# Patient Record
Sex: FEMALE | Race: WHITE
[De-identification: names, ages, dates, MRNs, and addresses within clinical notes are randomized per-mention and may not be internally consistent; named-entity substitution may affect disease eponyms.]

---

## 2020-09-12 ENCOUNTER — HOSPITAL ENCOUNTER (EMERGENCY)
Dept: HOSPITAL 9 - MADERS | Age: 65
Discharge: HOME | End: 2020-09-12
Payer: SELF-PAY

## 2020-09-12 DIAGNOSIS — I10: ICD-10-CM

## 2020-09-12 DIAGNOSIS — F17.210: ICD-10-CM

## 2020-09-12 DIAGNOSIS — M54.42: Primary | ICD-10-CM

## 2020-09-12 DIAGNOSIS — F41.9: ICD-10-CM

## 2020-09-12 DIAGNOSIS — Z79.899: ICD-10-CM

## 2020-09-12 PROCEDURE — 96372 THER/PROPH/DIAG INJ SC/IM: CPT

## 2020-09-12 PROCEDURE — 99283 EMERGENCY DEPT VISIT LOW MDM: CPT

## 2020-10-22 ENCOUNTER — HOSPITAL ENCOUNTER (INPATIENT)
Dept: HOSPITAL 92 - SJJU | Age: 65
LOS: 7 days | Discharge: HOME | DRG: 470 | End: 2020-10-29
Attending: INTERNAL MEDICINE | Admitting: ORTHOPAEDIC SURGERY
Payer: MEDICARE

## 2020-10-22 VITALS — BODY MASS INDEX: 19.5 KG/M2

## 2020-10-22 DIAGNOSIS — Z79.82: ICD-10-CM

## 2020-10-22 DIAGNOSIS — F17.210: ICD-10-CM

## 2020-10-22 DIAGNOSIS — Z88.1: ICD-10-CM

## 2020-10-22 DIAGNOSIS — M16.12: Primary | ICD-10-CM

## 2020-10-22 DIAGNOSIS — Z91.040: ICD-10-CM

## 2020-10-22 DIAGNOSIS — K21.9: ICD-10-CM

## 2020-10-22 DIAGNOSIS — I95.9: ICD-10-CM

## 2020-10-22 PROCEDURE — 85027 COMPLETE CBC AUTOMATED: CPT

## 2020-10-22 PROCEDURE — S0020 INJECTION, BUPIVICAINE HYDRO: HCPCS

## 2020-10-22 PROCEDURE — 82533 TOTAL CORTISOL: CPT

## 2020-10-22 PROCEDURE — 80048 BASIC METABOLIC PNL TOTAL CA: CPT

## 2020-10-22 PROCEDURE — 36415 COLL VENOUS BLD VENIPUNCTURE: CPT

## 2020-10-23 ENCOUNTER — HOSPITAL ENCOUNTER (OUTPATIENT)
Dept: HOSPITAL 92 - LABBT | Age: 65
Discharge: HOME | End: 2020-10-23
Attending: ORTHOPAEDIC SURGERY
Payer: MEDICARE

## 2020-10-23 DIAGNOSIS — Z20.828: ICD-10-CM

## 2020-10-23 DIAGNOSIS — Z01.812: Primary | ICD-10-CM

## 2020-10-23 DIAGNOSIS — M16.12: ICD-10-CM

## 2020-10-23 LAB
ANION GAP SERPL CALC-SCNC: 17 MMOL/L (ref 10–20)
BASOPHILS # BLD AUTO: 0.1 THOU/UL (ref 0–0.2)
BASOPHILS NFR BLD AUTO: 0.9 % (ref 0–1)
BUN SERPL-MCNC: 29 MG/DL (ref 9.8–20.1)
CALCIUM SERPL-MCNC: 10 MG/DL (ref 7.8–10.44)
CHLORIDE SERPL-SCNC: 107 MMOL/L (ref 98–107)
CO2 SERPL-SCNC: 23 MMOL/L (ref 23–31)
CREAT CL PREDICTED SERPL C-G-VRATE: 0 ML/MIN (ref 70–130)
EOSINOPHIL # BLD AUTO: 0.1 THOU/UL (ref 0–0.7)
EOSINOPHIL NFR BLD AUTO: 0.8 % (ref 0–10)
GLUCOSE SERPL-MCNC: 89 MG/DL (ref 80–115)
HGB BLD-MCNC: 12.6 G/DL (ref 12–16)
INR PPP: 0.9
LYMPHOCYTES # BLD: 1.6 THOU/UL (ref 1.2–3.4)
LYMPHOCYTES NFR BLD AUTO: 20.1 % (ref 21–51)
MCH RBC QN AUTO: 34.4 PG (ref 27–31)
MCV RBC AUTO: 98.1 FL (ref 78–98)
MONOCYTES # BLD AUTO: 0.5 THOU/UL (ref 0.11–0.59)
MONOCYTES NFR BLD AUTO: 6 % (ref 0–10)
NEUTROPHILS # BLD AUTO: 5.7 THOU/UL (ref 1.4–6.5)
NEUTROPHILS NFR BLD AUTO: 72.1 % (ref 42–75)
PLATELET # BLD AUTO: 236 THOU/UL (ref 130–400)
POTASSIUM SERPL-SCNC: 4.8 MMOL/L (ref 3.5–5.1)
PROTHROMBIN TIME: 12.2 SEC (ref 12–14.7)
RBC # BLD AUTO: 3.67 MILL/UL (ref 4.2–5.4)
SODIUM SERPL-SCNC: 142 MMOL/L (ref 136–145)
WBC # BLD AUTO: 7.9 THOU/UL (ref 4.8–10.8)

## 2020-10-23 PROCEDURE — 85610 PROTHROMBIN TIME: CPT

## 2020-10-23 PROCEDURE — U0003 INFECTIOUS AGENT DETECTION BY NUCLEIC ACID (DNA OR RNA); SEVERE ACUTE RESPIRATORY SYNDROME CORONAVIRUS 2 (SARS-COV-2) (CORONAVIRUS DISEASE [COVID-19]), AMPLIFIED PROBE TECHNIQUE, MAKING USE OF HIGH THROUGHPUT TECHNOLOGIES AS DESCRIBED BY CMS-2020-01-R: HCPCS

## 2020-10-23 PROCEDURE — 87635 SARS-COV-2 COVID-19 AMP PRB: CPT

## 2020-10-23 PROCEDURE — 87081 CULTURE SCREEN ONLY: CPT

## 2020-10-23 PROCEDURE — 85025 COMPLETE CBC W/AUTO DIFF WBC: CPT

## 2020-10-23 PROCEDURE — 80048 BASIC METABOLIC PNL TOTAL CA: CPT

## 2020-10-27 PROCEDURE — 0SRB049 REPLACEMENT OF LEFT HIP JOINT WITH CERAMIC ON POLYETHYLENE SYNTHETIC SUBSTITUTE, CEMENTED, OPEN APPROACH: ICD-10-PCS | Performed by: ORTHOPAEDIC SURGERY

## 2020-10-27 RX ADMIN — CEFAZOLIN SODIUM SCH MLS: 2 SOLUTION INTRAVENOUS at 20:36

## 2020-10-27 RX ADMIN — ASPIRIN SCH MG: 81 TABLET ORAL at 21:34

## 2020-10-27 NOTE — PDOC.HHP
Hospitalist HPI





- History of Present Illness


Left hip pain


History of Present Illness: 





The patient is a 65-year-old female with a past medical history significant for 

scoliosis and degenerative joint disease and GERD that presents to the hospital 

for a scheduled left total hip replacement by Dr. Soria.  The patient has had 

increased pain and swelling to her left lower extremity.  For these reasons she 

was scheduled for a left total hip replacement.  Apparently, after surgery, the 

patient came to the floor with a low blood pressure and was lethargic.  The 

patient was not getting any IV fluids and did have a fentanyl block infusing.  

The nurse called Dr. Gonzalez, who was the on-call orthopedic.  The block was 

stopped.  IV fluids were started.  We were consulted for management of the blood

pressure.  The patient reports that she has a history of low blood pressure.  

She denies any fever or chills.  No recent illness.  She denies feeling short of

breath, cough or wheezing.  She is not on any chronic steroids.  She denies any 

chest pain, heart palpitations, lightheadedness.  She denies any abdominal pain,

nausea, vomiting, diarrhea.  She denies any hemoptysis, hematochezia/melena.  

She denies any endocrine disorders.


ED Course: 





Direct admit.





Hospitalist ROS





- Review of Systems


All other systems reviewed; all pertinent +/- noted in HPI/Subj





- Medication


Medications: 


Active Medications











Generic Name Dose Route Start Last Admin





  Trade Name Freq  PRN Reason Stop Dose Admin


 


Aspirin  81 mg  10/27/20 21:00  10/27/20 21:34





  Aspirin 81 Mg Enteric Coated Tablet  PO   81 mg





  BID THAO   Administration


 


Sodium Chloride  1,000 mls @ 125 mls/hr  10/27/20 20:00  10/27/20 20:30





  Normal Saline 0.9%  IV   1,000 mls





  .Q8H THAO   Administration


 


Ketorolac Tromethamine  30 mg  10/27/20 18:00  10/27/20 19:56





  Ketorolac Tromethamine 30 Mg/Ml Vial  IVP  10/29/20 12:01  Not Given





  [facility]  








Home medications:


1.  Excedrin Migraine as needed


2.  Pepcid 20 mg p.o. twice daily as needed


3.  Woman's 50+ multivitamin daily


4.  Tizanidine as needed.





Allergies: Latex, ciprofloxacin





Hospitalist History





- Past Medical History


Source: patient, RN notes reviewed


Cardiac: reports: no pertinent history


Pulmonary: reports: no pertinent history


CNS: reports: Migraine


Musculoskeletal: reports: Other (Degenerative joint disease, scoliosis)





- Past Surgical History


Past Surgical History: reports:  (X5), Hernia Repair





- Family History


Family History: reports: no pertinent history


Other Family History: 





Noncontributory to this case





- Social History


Smoking Status: Current every day smoker (Half pack per day)


Tobacco Type: cigarettes


Alcohol: reports: None


Drugs: reports: none


Living Situation: Alone


Occupation: Works as a CNA


Activity level: uses cane/walker





- Exam


General Appearance: NAD, awake alert.  negative: ill appearing


Eye: PERRL, anicteric sclera


ENT: normocephalic atraumatic, dry oral mucosa


Neck: supple, symmetric, no JVD


Heart: RRR, no murmur, no gallops, no rubs, normal peripheral pulses


Respiratory: CTAB, no wheezes, no rales, no ronchi, normal chest expansion, no 

tachypnea


Gastrointestinal: soft, non-tender, normal bowel sounds, no bruit, no guarding, 

no rigidity


Extremities: no cyanosis, no edema


Skin: no rashes


Neurological: cranial nerve grossly intact, no focal deficits.  negative: facial

droop, speech deficit


Musculoskeletal - other findings: Status post left hip replacement


Psychiatric: normal affect, A&O x 3





Hospitalist Results





- Labs


Lab results: 


Reviewed 10/20/2020:


Sodium 142, potassium 4.8, chloride 107, CO2 23, BUN 29, creatinine 0.86, 

glucose 89, calcium 10


WBC 7.9, hemoglobin 12.6, hematocrit 36, platelets 236


Covid negative





Hospitalist H&P A/P





- Problem


(1) Hypotension


Status: Acute   





(2) Degenerative joint disease


Code(s): M19.90 - UNSPECIFIED OSTEOARTHRITIS, UNSPECIFIED SITE   Status: Chronic

  





(3) GERD (gastroesophageal reflux disease)


Code(s): K21.9 - GASTRO-ESOPHAGEAL REFLUX DISEASE WITHOUT ESOPHAGITIS   Status: 

Chronic   





(4) Status post total hip replacement, left


Code(s): Z96.642 - PRESENCE OF LEFT ARTIFICIAL HIP JOINT   Status: Acute   





- Plan


Plan: 





65/F with PMH DJD, GERD presents for scheduled left total hip replacement.  We 

were consulted for medical management for low blood pressure reading.


Patient admitted to surgical floor, inpatient status.  Expected length of stay 

greater than 2 midnights.





#Hypotension


Nursing called to report blood pressure reading 80s/50s.


Called Dr. Gonzalez, on-call orthopedic.


Ordered IV fluids and stop fentanyl block.


Upon assessment, patient alert and oriented x4.


BP 98/65.  Afebrile.  Glucose WNL.


Continue IV fluid hydration, analgesia as needed.





#Degenerative joint disease


Chronic.





#GERD


Takes Pepcid twice daily as needed.


Agreed to continue home dose of Pepcid.





#Status post total hip replacement, left


Performed by Dr. Soria.


Clinical course per orthopedics.





Aspirin for DVT prophylaxis.


Pepcid for GI prophylaxis.


Fentanyl nerve block per anesthesia.


Full code.


Family contact is her sister Jaynce Parmer at [phone].


Discussed the case with Dr. Aguirre.

## 2020-10-27 NOTE — RAD
Exam:2 views left hip



HISTORY: Status post arthroplasty



COMPARISON: 9/16/2020



FINDINGS: Postsurgical changes compatible with left hip arthroplasty. There are expected postoperativ
e changes in the soft tissues. Near anatomic alignment.



IMPRESSION: Findings compatible with left hip arthroplasty.



Reported By: Nazanin Mays 

Electronically Signed:  10/27/2020 4:07 PM

## 2020-10-28 LAB
ANION GAP SERPL CALC-SCNC: 12 MMOL/L (ref 10–20)
BUN SERPL-MCNC: 16 MG/DL (ref 9.8–20.1)
CALCIUM SERPL-MCNC: 8.4 MG/DL (ref 7.8–10.44)
CHLORIDE SERPL-SCNC: 109 MMOL/L (ref 98–107)
CO2 SERPL-SCNC: 23 MMOL/L (ref 23–31)
CREAT CL PREDICTED SERPL C-G-VRATE: 62 ML/MIN (ref 70–130)
GLUCOSE SERPL-MCNC: 113 MG/DL (ref 80–115)
HGB BLD-MCNC: 10.4 G/DL (ref 12–16)
MCH RBC QN AUTO: 33.6 PG (ref 27–31)
MCV RBC AUTO: 100 FL (ref 78–98)
PLATELET # BLD AUTO: 206 THOU/UL (ref 130–400)
POTASSIUM SERPL-SCNC: 4.5 MMOL/L (ref 3.5–5.1)
RBC # BLD AUTO: 3.11 MILL/UL (ref 4.2–5.4)
SODIUM SERPL-SCNC: 139 MMOL/L (ref 136–145)
WBC # BLD AUTO: 9.6 THOU/UL (ref 4.8–10.8)

## 2020-10-28 RX ADMIN — DOCUSATE SODIUM 50 MG AND SENNOSIDES 8.6 MG SCH TAB: 8.6; 5 TABLET, FILM COATED ORAL at 19:50

## 2020-10-28 RX ADMIN — MULTIPLE VITAMINS W/ MINERALS TAB SCH TAB: TAB at 08:46

## 2020-10-28 RX ADMIN — CEFAZOLIN SODIUM SCH MLS: 2 SOLUTION INTRAVENOUS at 05:15

## 2020-10-28 RX ADMIN — ASPIRIN SCH MG: 81 TABLET ORAL at 19:50

## 2020-10-28 RX ADMIN — ASPIRIN SCH MG: 81 TABLET ORAL at 08:46

## 2020-10-28 RX ADMIN — DOCUSATE SODIUM 50 MG AND SENNOSIDES 8.6 MG SCH TAB: 8.6; 5 TABLET, FILM COATED ORAL at 08:46

## 2020-10-28 NOTE — OP
DATE OF PROCEDURE:  10/27/2020



This is Stewart Henderson PA-C dictating a report for Oneil Soria MD.



PREOPERATIVE DIAGNOSIS:  End-stage bicompartmental osteoarthritis, left hip.



POSTOPERATIVE DIAGNOSIS:  End-stage bicompartmental osteoarthritis, left hip.



PROCEDURE PERFORMED:  Press-fit left total hip arthroplasty.



ASSISTANT:  Stewart Henderson PA-C



ANESTHESIA:  General via endotracheal tube augmented with indwelling epidural.



COMPONENTS USED:  [employer] Trident II titanium press-fit 48 mm press-fit

acetabular shell with an Accolade II press-fit size 4 hip stem, 0 degree

polyethylene fixed bearing insert, and a Biolox 36 mm outer diameter, -5 neck length

ceramic femoral head. 



ESTIMATED BLOOD LOSS:  100.



FINDINGS:  End-stage severe degenerative bicompartmental disease, bone-on-bone

arthrosis, periarticular osteophyte formation, large serous effusion, hypertrophic

synovium, and changes consistent with chronic degenerative osteoarthritis. 



DRAINS:  None.



SPECIMENS:  None.



COMPLICATIONS:  None.



COUNTS:  Correct.



INPUT:  1 L crystalloid.



OUTPUT:  500 mL clear yellow urine.



INDICATION FOR SURGERY:  Cinthia is a  65-year-old white female, who has had

progressive left hip groin and thigh pain, and problem with standing or walking for

the last 5 to 7 years.  She has failed conservative management, elected to proceed

with total hip arthroplasty as definitive treatment of her pain. 



PROCEDURE IN DETAIL:  After informed consent was obtained in the preoperative

holding area, the patient was taken to the operative suite where general anesthesia

was induced.  The patient was then positioned in the lateral decubitus position.

The hip was then prepped and draped in usual sterile fashion.  The patient received

preoperative antibiotics.  Prior to incision, time-out was called and all members of

the surgical team agreed upon site, surgeon, and patient.  After this, a

longitudinal incision was made directly over the trochanter, noted by palpation

extending 2 fingerbreadths above and below the trochanter.  The deeper subcutaneous

layer was undermined with Bovie electrocautery.  The iliotibial band was encountered

and incised sharply and the plane below this was developed bluntly.  A Charnley

retractor was placed to hold this opened.  The lateral aspect of the trochanter and

the abductor muscles were encountered and then reflected anteriorly off the

trochanter using Bovie electrocautery.  Once this was completed, the anterior

capsule was then encountered and identified and copious capsulotomy was carried out,

exposing the femoral neck and head.  Dislocation maneuver was then performed and an

in situ provisional neck cut was then made using the oscillating saw.  Attention was

then turned to acetabular preparation.  Sequential reaming was carried out up to the

appropriate diameter and a trial was then malleted into place with good firm

resistance and no pullout.  The permanent acetabular shell was then malleted

squarely into place, as was the appropriate liner.  Once completed, the wound was

copiously irrigated and attention was then turned to femoral preparation.  Flexion

and external rotation were performed of the exposed thigh and femoral elevators were

then placed at the proximal aspect of the wound.  Canal finder was used to establish

the length of the canal and sequential reaming was carried out, followed by

broaching.  Once the appropriate stability was established with the trial broaches

with flexion, extension and rotational stability, we did trial with neutral and 2 mm

offset incremental necks.  Once the appropriate size was decided upon, with good

stability noted with flexion, extension, internal and external rotation and shuck

being negative, we removed the femoral trial broach and malleted into place the

permanent prosthesis with good firm fit, which was also stable to rotation.  Again,

the hip felt very stable to flexion, extension, internal and external rotation.  Leg

lengths appeared near anatomic clinically and we were quite happy with prosthesis

placement.  Copious irrigation was then carried out through the entirety of the

wound.  Primary closure of the abductors was accomplished with interrupted #2 Vicryl

figure-of-eight stitches and the IT band was then closed with interrupted #2 Vicryl,

oversewn with a #2 running barbed Quill stitch.  Subcutaneous fascia was closed with

running barbed Quill stitch and a subcuticular Monocryl barbed Quill stitch was used

for skin closure and augmented with skin cement.  A sterile dressing was applied.

The procedure was terminated without any complication.  All counts were correct.

The patient was awakened in the operative suite and taken to the recovery room in

stable condition. 



The assistant surgeon helped throughout the procedure by positioning the patient,

stabilizing the limb, holding retractors, aligning the prosthesis, and closure of

procedure site. 



The assistant/co-surgeon was present through the entire procedure and was

responsible for providing exposure, tissue retraction and any necessary limb or

tissue manipulation required to obtain necessary reduction or hardware placement.

The assistant/co-surgeon also provided bleeding control, tissue closure, and

suturing in conjunction with the primary surgeon. 







Job ID:  [id number]

## 2020-10-28 NOTE — PDOC.HOSPP
- Subjective


Encounter Date: 10/28/20


Subjective: 


Patient was seen and examined.


She denies any new complaints today.





- Objective


Vital Signs & Weight: 


                             Vital Signs (12 hours)











  Temp Pulse Resp BP BP BP BP


 


 10/28/20 11:04  99.0 F  66  16    103/50 L 


 


 10/28/20 09:42     89/52 L  101/60  


 


 10/28/20 08:00       


 


 10/28/20 07:27  99.2 F  66  16    94/53 L 


 


 10/28/20 05:18  98.1 F  72  14     104/65














  Pulse Ox


 


 10/28/20 11:04  94 L


 


 10/28/20 09:42 


 


 10/28/20 08:00  96


 


 10/28/20 07:27  96


 


 10/28/20 05:18  98








                                     Weight











Weight                         110 lb














I&O: 


                                        











 10/27/20 10/28/20 10/29/20





 06:59 06:59 06:59


 


Intake Total  2200 


 


Output Total  1225 


 


Balance  975 











Result Diagrams: 


                                 10/28/20 05:25





                                 10/28/20 05:25





Hospitalist ROS





- Medication


Medications: 


Active Medications











Generic Name Dose Route Start Last Admin





  Trade Name Freq  PRN Reason Stop Dose Admin


 


Aspirin  81 mg  10/27/20 21:00  10/28/20 08:46





  Aspirin 81 Mg Enteric Coated Tablet  PO   81 mg





  BID THAO   Administration


 


Ferrous Gluconate  324 mg  10/28/20 08:00  10/28/20 08:45





  Ferrous Gluconate 324 Mg Tab  PO   324 mg





  BID-WM THAO   Administration


 


Sodium Chloride  1,000 mls @ 125 mls/hr  10/27/20 20:00  10/28/20 13:40





  Normal Saline 0.9%  IV   1,000 mls





  .Q8H THAO   Administration


 


Iron/Minerals/Multivitamins  1 tab  10/28/20 09:00  10/28/20 08:46





  Multivitamin W/ Minerals 1 Tab  PO   1 tab





  DAILY THAO   Administration


 


Ketorolac Tromethamine  30 mg  10/27/20 18:00  10/28/20 13:39





  Ketorolac Tromethamine 30 Mg/Ml Vial  IVP  10/29/20 12:01  30 mg





  Q6HR THAO   Administration


 


Senna/Docusate Sodium  2 tab  10/28/20 09:00  10/28/20 08:46





  Senokot S 8.6-50 Mg Tab  PO   2 tab





  BID THAO   Administration














- Exam


General Appearance: awake alert


ENT: normocephalic atraumatic


Neck: supple, no JVD


Heart: RRR


Respiratory: no tachypnea


Gastrointestinal: soft


Neurological: cranial nerve grossly intact, no focal deficits





Hosp A/P


(1) Hypotension


Status: Acute   





(2) Status post total hip replacement, left


Code(s): Z96.642 - PRESENCE OF LEFT ARTIFICIAL HIP JOINT   Status: Acute   





(3) Degenerative joint disease


Code(s): M19.90 - UNSPECIFIED OSTEOARTHRITIS, UNSPECIFIED SITE   Status: Chronic

  





(4) GERD (gastroesophageal reflux disease)


Code(s): K21.9 - GASTRO-ESOPHAGEAL REFLUX DISEASE WITHOUT ESOPHAGITIS   Status: 

Chronic   





- Plan





The patient hypotension has improved since yesterday.


Continue IV hydration for the next 24 hours.


Continue postoperative management per surgical team.


Lovenox for DVT prophylaxis.


PT and OT evaluation.

## 2020-10-29 VITALS — TEMPERATURE: 97.9 F | SYSTOLIC BLOOD PRESSURE: 91 MMHG | DIASTOLIC BLOOD PRESSURE: 51 MMHG

## 2020-10-29 LAB
HGB BLD-MCNC: 8.5 G/DL (ref 12–16)
MCH RBC QN AUTO: 33 PG (ref 27–31)
MCV RBC AUTO: 102 FL (ref 78–98)
PLATELET # BLD AUTO: 147 THOU/UL (ref 130–400)
RBC # BLD AUTO: 2.58 MILL/UL (ref 4.2–5.4)
WBC # BLD AUTO: 7 THOU/UL (ref 4.8–10.8)

## 2020-10-29 RX ADMIN — MULTIPLE VITAMINS W/ MINERALS TAB SCH TAB: TAB at 09:34

## 2020-10-29 RX ADMIN — DOCUSATE SODIUM 50 MG AND SENNOSIDES 8.6 MG SCH TAB: 8.6; 5 TABLET, FILM COATED ORAL at 09:34

## 2020-10-29 RX ADMIN — ASPIRIN SCH MG: 81 TABLET ORAL at 09:33

## 2020-10-29 NOTE — PDOC.EVN
Event Note





- Event Note


Event Note: 





Nursing called, patient hypotensive, asymptomatic. Afebrile. Fentanyl block 

held. Gave 1L NS. Review of vital signs shows BP stable throughout dayshift, 

then drops at night. Likely normal physiologic response at night. No signs of 

FVO. Will give 1 more 500ml bolus. Discussed with Dr. Aguirre.

## 2020-10-29 NOTE — PDOC.HOSPP
- Subjective


Encounter Date: 10/29/20


Subjective: 


Patient has no new complaints today.


She denies any dizziness, palpitations, chest pain, or syncope.





- Objective


Vital Signs & Weight: 


                             Vital Signs (12 hours)











  Temp Pulse Resp BP BP Pulse Ox


 


 10/29/20 09:38       95


 


 10/29/20 07:49  97.9 F  52 L  12  91/51 L   92 L


 


 10/29/20 03:25  98.3 F  75  16   85/51 L  95


 


 10/29/20 02:35   57 L    92/58 L 








                                     Weight











Admit Weight                   110 lb


 


Weight                         110 lb














I&O: 


                                        











 10/28/20 10/29/20 10/30/20





 06:59 06:59 06:59


 


Intake Total 2200 4505 


 


Output Total 1225 2325 


 


Balance 975 2180 











Result Diagrams: 


                                 10/29/20 05:05





                                 10/28/20 05:25





Hospitalist ROS





- Medication


Medications: 


Active Medications











Generic Name Dose Route Start Last Admin





  Trade Name Freq  PRN Reason Stop Dose Admin


 


Aspirin  81 mg  10/27/20 21:00  10/29/20 09:33





  Aspirin 81 Mg Enteric Coated Tablet  PO   81 mg





  BID THAO   Administration


 


Ferrous Gluconate  324 mg  10/28/20 08:00  10/29/20 09:32





  Ferrous Gluconate 324 Mg Tab  PO   324 mg





  BID-WM THAO   Administration


 


Sodium Chloride  1,000 mls @ 125 mls/hr  10/27/20 20:00  10/28/20 23:35





  Normal Saline 0.9%  IV   1,000 mls





  .Q8H THAO   Administration


 


Iron/Minerals/Multivitamins  1 tab  10/28/20 09:00  10/29/20 09:34





  Multivitamin W/ Minerals 1 Tab  PO   1 tab





  DAILY THAO   Administration


 


Ketorolac Tromethamine  30 mg  10/27/20 18:00  10/29/20 05:41





  Ketorolac Tromethamine 30 Mg/Ml Vial  IVP  10/29/20 12:01  30 mg





  Q6HR THAO   Administration


 


Ondansetron HCl  4 mg  10/27/20 14:00  10/28/20 22:31





  Ondansetron Pf 4 Mg/2 Ml Vial  IVP   4 mg





  Q6H PRN   Administration





  Nausea/Vomiting  


 


Senna/Docusate Sodium  2 tab  10/28/20 09:00  10/29/20 09:34





  Senokot S 8.6-50 Mg Tab  PO   2 tab





  BID THAO   Administration


 


Tizanidine HCl  4 mg  10/27/20 12:57  10/28/20 14:36





  Tizanidine Hcl 4 Mg Tab  PO   4 mg





  Q6H PRN   Administration





  Muscle Spasm  














- Exam


General Appearance: awake alert


ENT: normocephalic atraumatic


Neck: supple, no JVD


Respiratory: normal chest expansion, no tachypnea


Extremities: no cyanosis, no clubbing


Neurological: cranial nerve grossly intact, no focal deficits





Hosp A/P


(1) Hypotension


Status: Acute   





(2) Status post total hip replacement, left


Code(s): Z96.642 - PRESENCE OF LEFT ARTIFICIAL HIP JOINT   Status: Acute   





(3) Degenerative joint disease


Code(s): M19.90 - UNSPECIFIED OSTEOARTHRITIS, UNSPECIFIED SITE   Status: Chronic

  





(4) GERD (gastroesophageal reflux disease)


Code(s): K21.9 - GASTRO-ESOPHAGEAL REFLUX DISEASE WITHOUT ESOPHAGITIS   Status: 

Chronic   





- Plan





The patient systolic blood pressure is in the mid 90s.


The patient is completely asymptomatic.


Continue postoperative management per surgical team.


She is medically stable to be discharged.


Lovenox for DVT prophylaxis.


PT and OT evaluation.

## 2020-11-13 ENCOUNTER — HOSPITAL ENCOUNTER (EMERGENCY)
Dept: HOSPITAL 9 - MADERS | Age: 65
Discharge: HOME | End: 2020-11-13
Payer: MEDICARE

## 2020-11-13 DIAGNOSIS — F17.210: ICD-10-CM

## 2020-11-13 DIAGNOSIS — I10: ICD-10-CM

## 2020-11-13 DIAGNOSIS — N10: Primary | ICD-10-CM

## 2020-11-13 DIAGNOSIS — D50.0: ICD-10-CM

## 2020-11-13 DIAGNOSIS — F41.9: ICD-10-CM

## 2020-11-13 DIAGNOSIS — E87.6: ICD-10-CM

## 2020-11-13 LAB
ALBUMIN SERPL BCG-MCNC: 3.8 G/DL (ref 3.4–4.8)
ALP SERPL-CCNC: 294 U/L (ref 40–110)
ALT SERPL W P-5'-P-CCNC: 38 U/L (ref 8–55)
AMYLASE SERPL-CCNC: 69 U/L (ref 25–125)
ANION GAP SERPL CALC-SCNC: 19 MMOL/L (ref 10–20)
AST SERPL-CCNC: 41 U/L (ref 5–34)
BACTERIA UR QL AUTO: (no result) HPF
BASOPHILS # BLD AUTO: 0.1 THOU/UL (ref 0–0.2)
BASOPHILS NFR BLD AUTO: 0.7 % (ref 0–1)
BILIRUB SERPL-MCNC: 0.2 MG/DL (ref 0.2–1.2)
BUN SERPL-MCNC: 19 MG/DL (ref 9.8–20.1)
CALCIUM SERPL-MCNC: 9.6 MG/DL (ref 7.8–10.44)
CHLORIDE SERPL-SCNC: 95 MMOL/L (ref 98–107)
CK SERPL-CCNC: 78 U/L (ref 29–168)
CO2 SERPL-SCNC: 26 MMOL/L (ref 23–31)
CREAT CL PREDICTED SERPL C-G-VRATE: 0 ML/MIN (ref 70–130)
EOSINOPHIL # BLD AUTO: 0.1 THOU/UL (ref 0–0.7)
EOSINOPHIL NFR BLD AUTO: 1.1 % (ref 0–10)
GLOBULIN SER CALC-MCNC: 3.9 G/DL (ref 2.4–3.5)
GLUCOSE SERPL-MCNC: 109 MG/DL (ref 80–115)
HGB BLD-MCNC: 10 G/DL (ref 12–16)
LIPASE SERPL-CCNC: 26 U/L (ref 8–78)
LYMPHOCYTES # BLD AUTO: 0.8 THOU/UL (ref 1.2–3.4)
LYMPHOCYTES NFR BLD AUTO: 8.8 % (ref 21–51)
MCH RBC QN AUTO: 31.6 PG (ref 27–31)
MCV RBC AUTO: 99.8 FL (ref 78–98)
MONOCYTES # BLD AUTO: 0.9 THOU/UL (ref 0.11–0.59)
MONOCYTES NFR BLD AUTO: 9.5 % (ref 0–10)
NEUTROPHILS # BLD AUTO: 7.5 THOU/UL (ref 1.4–6.5)
NEUTROPHILS NFR BLD AUTO: 79.9 % (ref 42–75)
PLATELET # BLD AUTO: 267 THOU/UL (ref 130–400)
POTASSIUM SERPL-SCNC: 3 MMOL/L (ref 3.5–5.1)
PROT UR STRIP.AUTO-MCNC: 30 MG/DL
RBC # BLD AUTO: 3.15 MILL/UL (ref 4.2–5.4)
RBC UR QL AUTO: (no result) HPF (ref 0–3)
SODIUM SERPL-SCNC: 137 MMOL/L (ref 136–145)
SP GR UR STRIP: 1.01 (ref 1–1.03)
WBC # BLD AUTO: 9.4 THOU/UL (ref 4.8–10.8)

## 2020-11-13 PROCEDURE — 82550 ASSAY OF CK (CPK): CPT

## 2020-11-13 PROCEDURE — 81015 MICROSCOPIC EXAM OF URINE: CPT

## 2020-11-13 PROCEDURE — 87086 URINE CULTURE/COLONY COUNT: CPT

## 2020-11-13 PROCEDURE — 84484 ASSAY OF TROPONIN QUANT: CPT

## 2020-11-13 PROCEDURE — 80053 COMPREHEN METABOLIC PANEL: CPT

## 2020-11-13 PROCEDURE — 87186 SC STD MICRODIL/AGAR DIL: CPT

## 2020-11-13 PROCEDURE — U0003 INFECTIOUS AGENT DETECTION BY NUCLEIC ACID (DNA OR RNA); SEVERE ACUTE RESPIRATORY SYNDROME CORONAVIRUS 2 (SARS-COV-2) (CORONAVIRUS DISEASE [COVID-19]), AMPLIFIED PROBE TECHNIQUE, MAKING USE OF HIGH THROUGHPUT TECHNOLOGIES AS DESCRIBED BY CMS-2020-01-R: HCPCS

## 2020-11-13 PROCEDURE — 73502 X-RAY EXAM HIP UNI 2-3 VIEWS: CPT

## 2020-11-13 PROCEDURE — 87077 CULTURE AEROBIC IDENTIFY: CPT

## 2020-11-13 PROCEDURE — 85025 COMPLETE CBC W/AUTO DIFF WBC: CPT

## 2020-11-13 PROCEDURE — 96375 TX/PRO/DX INJ NEW DRUG ADDON: CPT

## 2020-11-13 PROCEDURE — 87635 SARS-COV-2 COVID-19 AMP PRB: CPT

## 2020-11-13 PROCEDURE — 86140 C-REACTIVE PROTEIN: CPT

## 2020-11-13 PROCEDURE — 96365 THER/PROPH/DIAG IV INF INIT: CPT

## 2020-11-13 PROCEDURE — 83690 ASSAY OF LIPASE: CPT

## 2020-11-13 PROCEDURE — 93005 ELECTROCARDIOGRAM TRACING: CPT

## 2020-11-13 PROCEDURE — C9113 INJ PANTOPRAZOLE SODIUM, VIA: HCPCS

## 2020-11-13 PROCEDURE — 81003 URINALYSIS AUTO W/O SCOPE: CPT

## 2020-11-13 PROCEDURE — 82150 ASSAY OF AMYLASE: CPT

## 2020-11-13 NOTE — RAD
XR Hip Lt 2-3 View



History: Pain



Comparison: Radiograph October 27 2020



Findings: Intact left hip arthroplasty. Resolved subcutaneous emphysema. No acute displaced fracture 
or malalignment.



Numerous clips in the pelvis.



Impression: No acute osseous abnormality. Intact left hip arthroplasty.



Reported By: Jeremiah Espinosa 

Electronically Signed:  11/13/2020 7:53 AM

## 2021-08-26 ENCOUNTER — HOSPITAL ENCOUNTER (EMERGENCY)
Dept: HOSPITAL 9 - MADERS | Age: 66
Discharge: HOME | End: 2021-08-26
Payer: MEDICARE

## 2021-08-26 DIAGNOSIS — Z79.899: ICD-10-CM

## 2021-08-26 DIAGNOSIS — M79.10: ICD-10-CM

## 2021-08-26 DIAGNOSIS — R10.12: Primary | ICD-10-CM

## 2021-08-26 DIAGNOSIS — F17.210: ICD-10-CM

## 2021-08-26 DIAGNOSIS — I10: ICD-10-CM

## 2021-08-26 DIAGNOSIS — J06.9: ICD-10-CM

## 2021-08-26 DIAGNOSIS — R30.0: ICD-10-CM

## 2021-08-26 LAB
ALBUMIN SERPL BCG-MCNC: 4.1 G/DL (ref 3.4–4.8)
ALP SERPL-CCNC: 90 U/L (ref 40–110)
ALT SERPL W P-5'-P-CCNC: 32 U/L (ref 8–55)
ANION GAP SERPL CALC-SCNC: 17 MMOL/L (ref 10–20)
AST SERPL-CCNC: 42 U/L (ref 5–34)
BASOPHILS # BLD AUTO: 0.1 THOU/UL (ref 0–0.2)
BASOPHILS NFR BLD AUTO: 1.8 % (ref 0–1)
BILIRUB SERPL-MCNC: 0.2 MG/DL (ref 0.2–1.2)
BUN SERPL-MCNC: 23 MG/DL (ref 9.8–20.1)
CALCIUM SERPL-MCNC: 9 MG/DL (ref 7.8–10.44)
CHLORIDE SERPL-SCNC: 107 MMOL/L (ref 98–107)
CK SERPL-CCNC: 134 U/L (ref 29–168)
CO2 SERPL-SCNC: 19 MMOL/L (ref 23–31)
CREAT CL PREDICTED SERPL C-G-VRATE: 0 ML/MIN (ref 70–130)
EOSINOPHIL # BLD AUTO: 0 THOU/UL (ref 0–0.7)
EOSINOPHIL NFR BLD AUTO: 0.7 % (ref 0–10)
GLOBULIN SER CALC-MCNC: 3.4 G/DL (ref 2.4–3.5)
GLUCOSE SERPL-MCNC: 80 MG/DL (ref 80–115)
HGB BLD-MCNC: 12.4 G/DL (ref 12–16)
LIPASE SERPL-CCNC: 31 U/L (ref 8–78)
LYMPHOCYTES # BLD AUTO: 0.9 THOU/UL (ref 1.2–3.4)
LYMPHOCYTES NFR BLD AUTO: 30.4 % (ref 21–51)
MAGNESIUM SERPL-MCNC: 1.7 MG/DL (ref 1.6–2.6)
MCH RBC QN AUTO: 31.6 PG (ref 27–31)
MCV RBC AUTO: 101.7 FL (ref 78–98)
MONOCYTES # BLD AUTO: 0.4 THOU/UL (ref 0.11–0.59)
MONOCYTES NFR BLD AUTO: 14.4 % (ref 0–10)
NEUTROPHILS # BLD AUTO: 1.6 THOU/UL (ref 1.4–6.5)
NEUTROPHILS NFR BLD AUTO: 52.7 % (ref 42–75)
PLATELET # BLD AUTO: 194 THOU/UL (ref 130–400)
POTASSIUM SERPL-SCNC: 4.5 MMOL/L (ref 3.5–5.1)
RBC # BLD AUTO: 3.92 MILL/UL (ref 4.2–5.4)
SODIUM SERPL-SCNC: 138 MMOL/L (ref 136–145)
SP GR UR STRIP: 1.02 (ref 1–1.03)
WBC # BLD AUTO: 3 THOU/UL (ref 4.8–10.8)

## 2021-08-26 PROCEDURE — 80053 COMPREHEN METABOLIC PANEL: CPT

## 2021-08-26 PROCEDURE — 74176 CT ABD & PELVIS W/O CONTRAST: CPT

## 2021-08-26 PROCEDURE — 85025 COMPLETE CBC W/AUTO DIFF WBC: CPT

## 2021-08-26 PROCEDURE — 96374 THER/PROPH/DIAG INJ IV PUSH: CPT

## 2021-08-26 PROCEDURE — 82550 ASSAY OF CK (CPK): CPT

## 2021-08-26 PROCEDURE — 71046 X-RAY EXAM CHEST 2 VIEWS: CPT

## 2021-08-26 PROCEDURE — 83735 ASSAY OF MAGNESIUM: CPT

## 2021-08-26 PROCEDURE — 83690 ASSAY OF LIPASE: CPT

## 2021-08-26 PROCEDURE — 81003 URINALYSIS AUTO W/O SCOPE: CPT

## 2021-09-02 ENCOUNTER — HOSPITAL ENCOUNTER (EMERGENCY)
Dept: HOSPITAL 9 - MADERS | Age: 66
Discharge: ANOTHER HOSPITAL | End: 2021-09-02
Payer: MEDICARE

## 2021-09-02 DIAGNOSIS — R74.8: ICD-10-CM

## 2021-09-02 DIAGNOSIS — R11.2: ICD-10-CM

## 2021-09-02 DIAGNOSIS — U07.1: Primary | ICD-10-CM

## 2021-09-02 DIAGNOSIS — F17.210: ICD-10-CM

## 2021-09-02 DIAGNOSIS — I10: ICD-10-CM

## 2021-09-02 DIAGNOSIS — E87.6: ICD-10-CM

## 2021-09-02 LAB
ALBUMIN SERPL BCG-MCNC: 2.9 G/DL (ref 3.4–4.8)
ALP SERPL-CCNC: 76 U/L (ref 40–110)
ALT SERPL W P-5'-P-CCNC: 70 U/L (ref 8–55)
ANION GAP SERPL CALC-SCNC: 12 MMOL/L (ref 10–20)
ANISOCYTOSIS BLD QL SMEAR: (no result) (100X)
AST SERPL-CCNC: 58 U/L (ref 5–34)
BACTERIA UR QL AUTO: (no result) HPF
BILIRUB SERPL-MCNC: 0.2 MG/DL (ref 0.2–1.2)
BUN SERPL-MCNC: 7 MG/DL (ref 9.8–20.1)
CALCIUM SERPL-MCNC: 7.3 MG/DL (ref 7.8–10.44)
CHLORIDE SERPL-SCNC: 112 MMOL/L (ref 98–107)
CO2 SERPL-SCNC: 20 MMOL/L (ref 23–31)
CREAT CL PREDICTED SERPL C-G-VRATE: 0 ML/MIN (ref 70–130)
GLOBULIN SER CALC-MCNC: 2.6 G/DL (ref 2.4–3.5)
GLUCOSE SERPL-MCNC: 84 MG/DL (ref 80–115)
HGB BLD-MCNC: 12.8 G/DL (ref 12–16)
LIPASE SERPL-CCNC: 13 U/L (ref 8–78)
MAGNESIUM SERPL-MCNC: 1.2 MG/DL (ref 1.6–2.6)
MANUAL DIFF??: YES
MCH RBC QN AUTO: 30.8 PG (ref 27–31)
MCV RBC AUTO: 96.9 FL (ref 78–98)
MDIFF COMPLETE?: YES
PLATELET # BLD AUTO: 161 THOU/UL (ref 130–400)
POTASSIUM SERPL-SCNC: 3.1 MMOL/L (ref 3.5–5.1)
RBC # BLD AUTO: 4.16 MILL/UL (ref 4.2–5.4)
RBC UR QL AUTO: (no result) HPF (ref 0–3)
SARS-COV-2 RNA RESP QL NAA+PROBE: DETECTED
SODIUM SERPL-SCNC: 141 MMOL/L (ref 136–145)
SP GR UR STRIP: 1.02 (ref 1–1.03)
WBC # BLD AUTO: 5.9 THOU/UL (ref 4.8–10.8)
WBC UR QL AUTO: (no result) HPF (ref 0–3)

## 2021-09-02 PROCEDURE — 81003 URINALYSIS AUTO W/O SCOPE: CPT

## 2021-09-02 PROCEDURE — 80053 COMPREHEN METABOLIC PANEL: CPT

## 2021-09-02 PROCEDURE — 81015 MICROSCOPIC EXAM OF URINE: CPT

## 2021-09-02 PROCEDURE — U0002 COVID-19 LAB TEST NON-CDC: HCPCS

## 2021-09-02 PROCEDURE — 71046 X-RAY EXAM CHEST 2 VIEWS: CPT

## 2021-09-02 PROCEDURE — 96365 THER/PROPH/DIAG IV INF INIT: CPT

## 2021-09-02 PROCEDURE — 85025 COMPLETE CBC W/AUTO DIFF WBC: CPT

## 2021-09-02 PROCEDURE — 96375 TX/PRO/DX INJ NEW DRUG ADDON: CPT

## 2021-09-02 PROCEDURE — 99285 EMERGENCY DEPT VISIT HI MDM: CPT

## 2021-09-02 PROCEDURE — 83690 ASSAY OF LIPASE: CPT

## 2021-09-02 PROCEDURE — 83735 ASSAY OF MAGNESIUM: CPT

## 2022-04-19 ENCOUNTER — HOSPITAL ENCOUNTER (EMERGENCY)
Dept: HOSPITAL 9 - MADERS | Age: 67
Discharge: HOME | End: 2022-04-19
Payer: MEDICARE

## 2022-04-19 DIAGNOSIS — E87.6: Primary | ICD-10-CM

## 2022-04-19 DIAGNOSIS — M54.9: ICD-10-CM

## 2022-04-19 DIAGNOSIS — F17.210: ICD-10-CM

## 2022-04-19 DIAGNOSIS — I10: ICD-10-CM

## 2022-04-19 DIAGNOSIS — R10.84: ICD-10-CM

## 2022-04-19 DIAGNOSIS — Z79.899: ICD-10-CM

## 2022-04-19 LAB
ALBUMIN SERPL BCG-MCNC: 4.4 G/DL (ref 3.4–4.8)
ALP SERPL-CCNC: 55 U/L (ref 40–110)
ALT SERPL W P-5'-P-CCNC: 16 U/L (ref 8–55)
ANION GAP SERPL CALC-SCNC: 17 MMOL/L (ref 10–20)
APAP SERPL-MCNC: (no result) MCG/ML (ref 10–30)
AST SERPL-CCNC: 23 U/L (ref 5–34)
BASOPHILS # BLD AUTO: 0.1 THOU/UL (ref 0–0.2)
BASOPHILS NFR BLD AUTO: 1 % (ref 0–1)
BILIRUB SERPL-MCNC: 0.2 MG/DL (ref 0.2–1.2)
BUN SERPL-MCNC: 8 MG/DL (ref 9.8–20.1)
CA-I BLDV-SCNC: 1.17 MMOL/L (ref 1.15–1.33)
CALCIUM SERPL-MCNC: 9.5 MG/DL (ref 7.8–10.44)
CHLORIDE BLDV-SCNC: 105 MMOL/L (ref 98–107)
CHLORIDE SERPL-SCNC: 104 MMOL/L (ref 98–107)
CO2 BLDV CALC-SCNC: 32.7 MMOL/L (ref 22–28)
CO2 SERPL-SCNC: 28 MMOL/L (ref 23–31)
CREAT CL PREDICTED SERPL C-G-VRATE: 0 ML/MIN (ref 70–130)
DRUG SCREEN CUTOFF: (no result)
EOSINOPHIL # BLD AUTO: 0.1 THOU/UL (ref 0–0.7)
EOSINOPHIL NFR BLD AUTO: 1.5 % (ref 0–10)
GLOBULIN SER CALC-MCNC: 2.8 G/DL (ref 2.4–3.5)
GLUCOSE SERPL-MCNC: 99 MG/DL (ref 80–115)
HCO3 BLDV-SCNC: 31 MMOL/L (ref 22–28)
HCT VFR BLD CALC: 40 % (ref 36–47)
HGB BLD CALC-MCNC: 13.5 G/DL (ref 12–16)
HGB BLD-MCNC: 12.1 G/DL (ref 12–16)
LIPASE SERPL-CCNC: 82 U/L (ref 8–78)
LYMPHOCYTES # BLD AUTO: 2.4 THOU/UL (ref 1.2–3.4)
LYMPHOCYTES NFR BLD AUTO: 46.4 % (ref 21–51)
MACROCYTES BLD QL SMEAR: (no result) (100X)
MAGNESIUM SERPL-MCNC: 1.6 MG/DL (ref 1.6–2.6)
MCH RBC QN AUTO: 31.2 PG (ref 27–31)
MCV RBC AUTO: 105.4 FL (ref 78–98)
MDIFF COMPLETE?: YES
MEDTOX CONTROL LINE VALID?: (no result)
MONOCYTES # BLD AUTO: 0.5 THOU/UL (ref 0.11–0.59)
MONOCYTES NFR BLD AUTO: 9.2 % (ref 0–10)
NEUTROPHILS # BLD AUTO: 2.2 THOU/UL (ref 1.4–6.5)
NEUTROPHILS NFR BLD AUTO: 41.9 % (ref 42–75)
PCO2 BLDV: 53.6 MMHG (ref 42–51)
PLATELET # BLD AUTO: 196 THOU/UL (ref 130–400)
POTASSIUM BLDV-SCNC: 2.4 MMOL/L (ref 3.5–5.1)
POTASSIUM SERPL-SCNC: 2.6 MMOL/L (ref 3.5–5.1)
RBC # BLD AUTO: 3.89 MILL/UL (ref 4.2–5.4)
SALICYLATES SERPL-MCNC: 16.5 MG/DL (ref 15–30)
SAO2 % BLDV FROM PO2: 69.6 % (ref 60–85)
SODIUM BLDV-SCNC: 147 MMOL/L (ref 138–145)
SODIUM SERPL-SCNC: 146 MMOL/L (ref 136–145)
SP GR UR STRIP: 1.01 (ref 1–1.03)
WBC # BLD AUTO: 5.1 THOU/UL (ref 4.8–10.8)

## 2022-04-19 PROCEDURE — 81003 URINALYSIS AUTO W/O SCOPE: CPT

## 2022-04-19 PROCEDURE — 84443 ASSAY THYROID STIM HORMONE: CPT

## 2022-04-19 PROCEDURE — 82330 ASSAY OF CALCIUM: CPT

## 2022-04-19 PROCEDURE — 83690 ASSAY OF LIPASE: CPT

## 2022-04-19 PROCEDURE — 85025 COMPLETE CBC W/AUTO DIFF WBC: CPT

## 2022-04-19 PROCEDURE — 80307 DRUG TEST PRSMV CHEM ANLYZR: CPT

## 2022-04-19 PROCEDURE — 74177 CT ABD & PELVIS W/CONTRAST: CPT

## 2022-04-19 PROCEDURE — 96365 THER/PROPH/DIAG IV INF INIT: CPT

## 2022-04-19 PROCEDURE — 96366 THER/PROPH/DIAG IV INF ADDON: CPT

## 2022-04-19 PROCEDURE — 83880 ASSAY OF NATRIURETIC PEPTIDE: CPT

## 2022-04-19 PROCEDURE — 80053 COMPREHEN METABOLIC PANEL: CPT

## 2022-04-19 PROCEDURE — 82803 BLOOD GASES ANY COMBINATION: CPT

## 2022-04-19 PROCEDURE — 83735 ASSAY OF MAGNESIUM: CPT

## 2022-04-19 PROCEDURE — 80306 DRUG TEST PRSMV INSTRMNT: CPT

## 2022-06-14 ENCOUNTER — HOSPITAL ENCOUNTER (EMERGENCY)
Dept: HOSPITAL 92 - ERS | Age: 67
Discharge: HOME | End: 2022-06-14
Payer: MEDICARE

## 2022-06-14 DIAGNOSIS — F17.210: ICD-10-CM

## 2022-06-14 DIAGNOSIS — I10: ICD-10-CM

## 2022-06-14 DIAGNOSIS — K59.00: Primary | ICD-10-CM

## 2022-06-14 DIAGNOSIS — Z79.899: ICD-10-CM

## 2022-06-14 LAB
ALBUMIN SERPL BCG-MCNC: 4.7 G/DL (ref 3.4–4.8)
ALP SERPL-CCNC: 68 U/L (ref 40–110)
ALT SERPL W P-5'-P-CCNC: 21 U/L (ref 8–55)
ANION GAP SERPL CALC-SCNC: 15 MMOL/L (ref 10–20)
AST SERPL-CCNC: 21 U/L (ref 5–34)
BASOPHILS # BLD AUTO: 0 THOU/UL (ref 0–0.2)
BASOPHILS NFR BLD AUTO: 0.5 % (ref 0–1)
BILIRUB SERPL-MCNC: 0.2 MG/DL (ref 0.2–1.2)
BUN SERPL-MCNC: 17 MG/DL (ref 9.8–20.1)
CALCIUM SERPL-MCNC: 10.1 MG/DL (ref 7.8–10.44)
CHLORIDE SERPL-SCNC: 106 MMOL/L (ref 98–107)
CO2 SERPL-SCNC: 25 MMOL/L (ref 23–31)
CREAT CL PREDICTED SERPL C-G-VRATE: 0 ML/MIN (ref 70–130)
EOSINOPHIL # BLD AUTO: 0.1 THOU/UL (ref 0–0.7)
EOSINOPHIL NFR BLD AUTO: 1.3 % (ref 0–10)
GLOBULIN SER CALC-MCNC: 2.8 G/DL (ref 2.4–3.5)
GLUCOSE SERPL-MCNC: 100 MG/DL (ref 80–115)
HGB BLD-MCNC: 12.8 G/DL (ref 12–16)
LIPASE SERPL-CCNC: 16 U/L (ref 8–78)
LYMPHOCYTES # BLD: 1.8 THOU/UL (ref 1.2–3.4)
LYMPHOCYTES NFR BLD AUTO: 34.8 % (ref 21–51)
MCH RBC QN AUTO: 35 PG (ref 27–31)
MCV RBC AUTO: 104 FL (ref 78–98)
MONOCYTES # BLD AUTO: 0.4 THOU/UL (ref 0.11–0.59)
MONOCYTES NFR BLD AUTO: 7.5 % (ref 0–10)
NEUTROPHILS # BLD AUTO: 2.9 THOU/UL (ref 1.4–6.5)
NEUTROPHILS NFR BLD AUTO: 55.9 % (ref 42–75)
PLATELET # BLD AUTO: 245 THOU/UL (ref 130–400)
POTASSIUM SERPL-SCNC: 4.2 MMOL/L (ref 3.5–5.1)
RBC # BLD AUTO: 3.66 MILL/UL (ref 4.2–5.4)
SODIUM SERPL-SCNC: 142 MMOL/L (ref 136–145)
SP GR UR STRIP: 1.01 (ref 1–1.04)
WBC # BLD AUTO: 5.2 THOU/UL (ref 4.8–10.8)

## 2022-06-14 PROCEDURE — 83690 ASSAY OF LIPASE: CPT

## 2022-06-14 PROCEDURE — 85025 COMPLETE CBC W/AUTO DIFF WBC: CPT

## 2022-06-14 PROCEDURE — 87086 URINE CULTURE/COLONY COUNT: CPT

## 2022-06-14 PROCEDURE — 81003 URINALYSIS AUTO W/O SCOPE: CPT

## 2022-06-14 PROCEDURE — 80053 COMPREHEN METABOLIC PANEL: CPT

## 2022-06-14 PROCEDURE — 36415 COLL VENOUS BLD VENIPUNCTURE: CPT

## 2022-06-14 PROCEDURE — 74177 CT ABD & PELVIS W/CONTRAST: CPT

## 2022-11-27 ENCOUNTER — HOSPITAL ENCOUNTER (EMERGENCY)
Dept: HOSPITAL 9 - MADERS | Age: 67
LOS: 1 days | Discharge: HOME | End: 2022-11-28
Payer: MEDICARE

## 2022-11-27 DIAGNOSIS — K59.09: Primary | ICD-10-CM

## 2022-11-27 DIAGNOSIS — R11.2: ICD-10-CM

## 2022-11-27 DIAGNOSIS — I10: ICD-10-CM

## 2022-11-27 DIAGNOSIS — F17.210: ICD-10-CM

## 2022-11-27 DIAGNOSIS — Z79.899: ICD-10-CM

## 2022-11-27 PROCEDURE — 96374 THER/PROPH/DIAG INJ IV PUSH: CPT

## 2022-11-27 PROCEDURE — 83605 ASSAY OF LACTIC ACID: CPT

## 2022-11-27 PROCEDURE — 81003 URINALYSIS AUTO W/O SCOPE: CPT

## 2022-11-27 PROCEDURE — 96361 HYDRATE IV INFUSION ADD-ON: CPT

## 2022-11-27 PROCEDURE — 86140 C-REACTIVE PROTEIN: CPT

## 2022-11-27 PROCEDURE — 85025 COMPLETE CBC W/AUTO DIFF WBC: CPT

## 2022-11-27 PROCEDURE — 96372 THER/PROPH/DIAG INJ SC/IM: CPT

## 2022-11-27 PROCEDURE — 96375 TX/PRO/DX INJ NEW DRUG ADDON: CPT

## 2022-11-27 PROCEDURE — 84484 ASSAY OF TROPONIN QUANT: CPT

## 2022-11-27 PROCEDURE — 74177 CT ABD & PELVIS W/CONTRAST: CPT

## 2022-11-27 PROCEDURE — 80306 DRUG TEST PRSMV INSTRMNT: CPT

## 2022-11-27 PROCEDURE — 80053 COMPREHEN METABOLIC PANEL: CPT

## 2022-11-27 PROCEDURE — 83690 ASSAY OF LIPASE: CPT

## 2022-11-28 LAB
ALBUMIN SERPL BCG-MCNC: 4.7 G/DL (ref 3.4–4.8)
ALP SERPL-CCNC: 79 U/L (ref 40–110)
ALT SERPL W P-5'-P-CCNC: 14 U/L (ref 8–55)
ANION GAP SERPL CALC-SCNC: 19 MMOL/L (ref 10–20)
AST SERPL-CCNC: 20 U/L (ref 5–34)
BASOPHILS # BLD AUTO: 0.1 THOU/UL (ref 0–0.2)
BASOPHILS NFR BLD AUTO: 0.5 % (ref 0–1)
BILIRUB SERPL-MCNC: 0.3 MG/DL (ref 0.2–1.2)
BUN SERPL-MCNC: 10 MG/DL (ref 9.8–20.1)
CALCIUM SERPL-MCNC: 9.5 MG/DL (ref 7.8–10.44)
CHLORIDE SERPL-SCNC: 105 MMOL/L (ref 98–107)
CO2 SERPL-SCNC: 23 MMOL/L (ref 23–31)
CREAT CL PREDICTED SERPL C-G-VRATE: 0 ML/MIN (ref 70–130)
CRP SERPL-MCNC: (no result) MG/DL
DRUG SCREEN CUTOFF: (no result)
EOSINOPHIL # BLD AUTO: 0 THOU/UL (ref 0–0.7)
EOSINOPHIL NFR BLD AUTO: 0.3 % (ref 0–10)
GLOBULIN SER CALC-MCNC: 3.5 G/DL (ref 2.4–3.5)
GLUCOSE SERPL-MCNC: 88 MG/DL (ref 80–115)
HGB BLD-MCNC: 14 G/DL (ref 12–16)
LYMPHOCYTES # BLD AUTO: 1.2 THOU/UL (ref 1.2–3.4)
LYMPHOCYTES NFR BLD AUTO: 7.3 % (ref 21–51)
MCH RBC QN AUTO: 32.8 PG (ref 27–31)
MCV RBC AUTO: 100.2 FL (ref 78–98)
MEDTOX CONTROL LINE VALID?: (no result)
MONOCYTES # BLD AUTO: 0.4 THOU/UL (ref 0.11–0.59)
MONOCYTES NFR BLD AUTO: 2.5 % (ref 0–10)
NEUTROPHILS # BLD AUTO: 14.5 THOU/UL (ref 1.4–6.5)
NEUTROPHILS NFR BLD AUTO: 89.6 % (ref 42–75)
PLATELET # BLD AUTO: 216 10X3/UL (ref 130–400)
POTASSIUM SERPL-SCNC: 2.5 MMOL/L (ref 3.5–5.1)
RBC # BLD AUTO: 4.27 MILL/UL (ref 4.2–5.4)
SODIUM SERPL-SCNC: 144 MMOL/L (ref 136–145)
SP GR UR STRIP: 1.01 (ref 1–1.03)
WBC # BLD AUTO: 16.2 10X3/UL (ref 4.8–10.8)

## 2023-02-22 ENCOUNTER — HOSPITAL ENCOUNTER (OUTPATIENT)
Dept: HOSPITAL 92 - LABBT | Age: 68
Discharge: HOME | End: 2023-02-22
Attending: ORTHOPAEDIC SURGERY
Payer: MEDICARE

## 2023-02-22 DIAGNOSIS — M87.051: ICD-10-CM

## 2023-02-22 DIAGNOSIS — Z01.812: Primary | ICD-10-CM

## 2023-02-22 LAB
ANION GAP SERPL CALC-SCNC: 16 MMOL/L (ref 10–20)
BASOPHILS # BLD AUTO: 0.1 10X3/UL (ref 0–0.2)
BASOPHILS NFR BLD AUTO: 0.8 % (ref 0–2)
BUN SERPL-MCNC: 27 MG/DL (ref 9.8–20.1)
CALCIUM SERPL-MCNC: 9 MG/DL (ref 7.8–10.44)
CHLORIDE SERPL-SCNC: 110 MMOL/L (ref 98–107)
CO2 SERPL-SCNC: 20 MMOL/L (ref 23–31)
CREAT CL PREDICTED SERPL C-G-VRATE: 0 ML/MIN (ref 70–130)
EOSINOPHIL # BLD AUTO: 0.2 10X3/UL (ref 0–0.5)
EOSINOPHIL NFR BLD AUTO: 2.1 % (ref 0–6)
GLUCOSE SERPL-MCNC: 92 MG/DL (ref 80–115)
HGB BLD-MCNC: 10.7 G/DL (ref 12–15.5)
INR PPP: 0.9
LYMPHOCYTES NFR BLD AUTO: 25.5 % (ref 18–47)
MCH RBC QN AUTO: 31.5 PG (ref 27–33)
MCV RBC AUTO: 100.3 FL (ref 81.6–98.3)
MONOCYTES # BLD AUTO: 0.6 10X3/UL (ref 0–1.1)
MONOCYTES NFR BLD AUTO: 8.2 % (ref 0–10)
NEUTROPHILS # BLD AUTO: 4.9 10X3/UL (ref 1.5–8.4)
NEUTROPHILS NFR BLD AUTO: 63.1 % (ref 40–75)
PLATELET # BLD AUTO: 383 10X3/UL (ref 150–450)
POTASSIUM SERPL-SCNC: 4.3 MMOL/L (ref 3.5–5.1)
PROTHROMBIN TIME: 9.8 SEC (ref 9.5–12.1)
RBC # BLD AUTO: 3.4 10X6/UL (ref 3.9–5.03)
SODIUM SERPL-SCNC: 142 MMOL/L (ref 136–145)
WBC # BLD AUTO: 7.8 10X3/UL (ref 3.5–10.5)

## 2023-02-22 PROCEDURE — 93005 ELECTROCARDIOGRAM TRACING: CPT

## 2023-02-22 PROCEDURE — 80048 BASIC METABOLIC PNL TOTAL CA: CPT

## 2023-02-22 PROCEDURE — 85610 PROTHROMBIN TIME: CPT

## 2023-02-22 PROCEDURE — 87081 CULTURE SCREEN ONLY: CPT

## 2023-02-22 PROCEDURE — 93010 ELECTROCARDIOGRAM REPORT: CPT

## 2023-02-22 PROCEDURE — 85025 COMPLETE CBC W/AUTO DIFF WBC: CPT

## 2023-02-28 ENCOUNTER — HOSPITAL ENCOUNTER (INPATIENT)
Dept: HOSPITAL 92 - SDC | Age: 68
LOS: 3 days | Discharge: HOME HEALTH | DRG: 470 | End: 2023-03-03
Attending: ORTHOPAEDIC SURGERY | Admitting: ORTHOPAEDIC SURGERY
Payer: MEDICARE

## 2023-02-28 VITALS — BODY MASS INDEX: 21.4 KG/M2

## 2023-02-28 DIAGNOSIS — Z91.040: ICD-10-CM

## 2023-02-28 DIAGNOSIS — Z96.642: ICD-10-CM

## 2023-02-28 DIAGNOSIS — Z90.710: ICD-10-CM

## 2023-02-28 DIAGNOSIS — D63.1: ICD-10-CM

## 2023-02-28 DIAGNOSIS — I12.9: ICD-10-CM

## 2023-02-28 DIAGNOSIS — Z20.822: ICD-10-CM

## 2023-02-28 DIAGNOSIS — Z98.890: ICD-10-CM

## 2023-02-28 DIAGNOSIS — R53.81: ICD-10-CM

## 2023-02-28 DIAGNOSIS — I95.2: ICD-10-CM

## 2023-02-28 DIAGNOSIS — Z87.891: ICD-10-CM

## 2023-02-28 DIAGNOSIS — E87.6: ICD-10-CM

## 2023-02-28 DIAGNOSIS — N18.2: ICD-10-CM

## 2023-02-28 DIAGNOSIS — Z88.1: ICD-10-CM

## 2023-02-28 DIAGNOSIS — Z88.6: ICD-10-CM

## 2023-02-28 DIAGNOSIS — E83.42: ICD-10-CM

## 2023-02-28 DIAGNOSIS — M87.851: Primary | ICD-10-CM

## 2023-02-28 DIAGNOSIS — E03.9: ICD-10-CM

## 2023-02-28 DIAGNOSIS — Z79.899: ICD-10-CM

## 2023-02-28 DIAGNOSIS — Z79.82: ICD-10-CM

## 2023-02-28 PROCEDURE — U0002 COVID-19 LAB TEST NON-CDC: HCPCS

## 2023-02-28 PROCEDURE — C1776 JOINT DEVICE (IMPLANTABLE): HCPCS

## 2023-02-28 PROCEDURE — 80048 BASIC METABOLIC PNL TOTAL CA: CPT

## 2023-02-28 PROCEDURE — 72193 CT PELVIS W/DYE: CPT

## 2023-02-28 PROCEDURE — 96376 TX/PRO/DX INJ SAME DRUG ADON: CPT

## 2023-02-28 PROCEDURE — 82533 TOTAL CORTISOL: CPT

## 2023-02-28 PROCEDURE — 83735 ASSAY OF MAGNESIUM: CPT

## 2023-02-28 PROCEDURE — 86900 BLOOD TYPING SEROLOGIC ABO: CPT

## 2023-02-28 PROCEDURE — S0020 INJECTION, BUPIVICAINE HYDRO: HCPCS

## 2023-02-28 PROCEDURE — 86901 BLOOD TYPING SEROLOGIC RH(D): CPT

## 2023-02-28 PROCEDURE — 86850 RBC ANTIBODY SCREEN: CPT

## 2023-02-28 PROCEDURE — 80053 COMPREHEN METABOLIC PANEL: CPT

## 2023-02-28 PROCEDURE — 36415 COLL VENOUS BLD VENIPUNCTURE: CPT

## 2023-02-28 PROCEDURE — 84443 ASSAY THYROID STIM HORMONE: CPT

## 2023-02-28 PROCEDURE — 96374 THER/PROPH/DIAG INJ IV PUSH: CPT

## 2023-02-28 PROCEDURE — 85025 COMPLETE CBC W/AUTO DIFF WBC: CPT

## 2023-02-28 PROCEDURE — 96375 TX/PRO/DX INJ NEW DRUG ADDON: CPT

## 2023-02-28 PROCEDURE — 0SR902Z REPLACEMENT OF RIGHT HIP JOINT WITH METAL ON POLYETHYLENE SYNTHETIC SUBSTITUTE, OPEN APPROACH: ICD-10-PCS | Performed by: ORTHOPAEDIC SURGERY

## 2023-02-28 PROCEDURE — 36416 COLLJ CAPILLARY BLOOD SPEC: CPT

## 2023-02-28 RX ADMIN — HYDROCODONE BITARTRATE AND ACETAMINOPHEN PRN TAB: 10; 325 TABLET ORAL at 20:46

## 2023-02-28 RX ADMIN — ASPIRIN SCH MG: 81 TABLET ORAL at 22:12

## 2023-02-28 RX ADMIN — DOCUSATE SODIUM 50 MG AND SENNOSIDES 8.6 MG SCH TAB: 8.6; 5 TABLET, FILM COATED ORAL at 22:12

## 2023-03-01 LAB
ALBUMIN SERPL BCG-MCNC: 3.4 G/DL (ref 3.4–4.8)
ALP SERPL-CCNC: 96 U/L (ref 40–110)
ALT SERPL W P-5'-P-CCNC: 14 U/L (ref 8–55)
ANION GAP SERPL CALC-SCNC: 14 MMOL/L (ref 10–20)
AST SERPL-CCNC: 36 U/L (ref 5–34)
BASOPHILS # BLD AUTO: 0 THOU/UL (ref 0–0.2)
BASOPHILS NFR BLD AUTO: 0.3 % (ref 0–1)
BILIRUB SERPL-MCNC: (no result) MG/DL (ref 0.2–1.2)
BUN SERPL-MCNC: 12 MG/DL (ref 9.8–20.1)
CALCIUM SERPL-MCNC: 8.6 MG/DL (ref 7.8–10.44)
CHLORIDE SERPL-SCNC: 113 MMOL/L (ref 98–107)
CO2 SERPL-SCNC: 19 MMOL/L (ref 23–31)
CREAT CL PREDICTED SERPL C-G-VRATE: 53 ML/MIN (ref 70–130)
EOSINOPHIL # BLD AUTO: 0 THOU/UL (ref 0–0.7)
EOSINOPHIL NFR BLD AUTO: 0 % (ref 0–10)
GLOBULIN SER CALC-MCNC: 2.8 G/DL (ref 2.4–3.5)
GLUCOSE SERPL-MCNC: 99 MG/DL (ref 80–115)
HGB BLD-MCNC: 10.8 G/DL (ref 12–16)
HGB BLD-MCNC: 8.9 G/DL (ref 12–16)
LYMPHOCYTES # BLD: 1.4 THOU/UL (ref 1.2–3.4)
LYMPHOCYTES NFR BLD AUTO: 18.4 % (ref 21–51)
MCH RBC QN AUTO: 33.6 PG (ref 27–31)
MCV RBC AUTO: 104 FL (ref 78–98)
MONOCYTES # BLD AUTO: 0.7 THOU/UL (ref 0.11–0.59)
MONOCYTES NFR BLD AUTO: 9.1 % (ref 0–10)
NEUTROPHILS # BLD AUTO: 5.4 THOU/UL (ref 1.4–6.5)
NEUTROPHILS NFR BLD AUTO: 72.2 % (ref 42–75)
PLATELET # BLD AUTO: 307 10X3/UL (ref 130–400)
POTASSIUM SERPL-SCNC: 3.8 MMOL/L (ref 3.5–5.1)
RBC # BLD AUTO: 3.21 MILL/UL (ref 4.2–5.4)
SODIUM SERPL-SCNC: 142 MMOL/L (ref 136–145)
WBC # BLD AUTO: 7.4 10X3/UL (ref 4.8–10.8)

## 2023-03-01 RX ADMIN — CYANOCOBALAMIN TAB 1000 MCG SCH MCG: 1000 TAB at 20:18

## 2023-03-01 RX ADMIN — DOCUSATE SODIUM 50 MG AND SENNOSIDES 8.6 MG SCH TAB: 8.6; 5 TABLET, FILM COATED ORAL at 08:01

## 2023-03-01 RX ADMIN — HYDROCODONE BITARTRATE AND ACETAMINOPHEN PRN TAB: 10; 325 TABLET ORAL at 14:25

## 2023-03-01 RX ADMIN — DOCUSATE SODIUM 50 MG AND SENNOSIDES 8.6 MG SCH TAB: 8.6; 5 TABLET, FILM COATED ORAL at 08:03

## 2023-03-01 RX ADMIN — HYDROCODONE BITARTRATE AND ACETAMINOPHEN PRN TAB: 10; 325 TABLET ORAL at 08:56

## 2023-03-01 RX ADMIN — ASPIRIN SCH MG: 81 TABLET ORAL at 20:17

## 2023-03-01 RX ADMIN — ASPIRIN SCH MG: 81 TABLET ORAL at 08:01

## 2023-03-01 RX ADMIN — MULTIPLE VITAMINS W/ MINERALS TAB SCH TAB: TAB at 08:01

## 2023-03-01 RX ADMIN — DOCUSATE SODIUM 50 MG AND SENNOSIDES 8.6 MG SCH TAB: 8.6; 5 TABLET, FILM COATED ORAL at 20:18

## 2023-03-02 LAB
ANION GAP SERPL CALC-SCNC: 12 MMOL/L (ref 10–20)
BASOPHILS # BLD AUTO: 0 THOU/UL (ref 0–0.2)
BASOPHILS NFR BLD AUTO: 0.5 % (ref 0–1)
BUN SERPL-MCNC: 15 MG/DL (ref 9.8–20.1)
CALCIUM SERPL-MCNC: 8.7 MG/DL (ref 7.8–10.44)
CHLORIDE SERPL-SCNC: 114 MMOL/L (ref 98–107)
CO2 SERPL-SCNC: 20 MMOL/L (ref 23–31)
CREAT CL PREDICTED SERPL C-G-VRATE: 57 ML/MIN (ref 70–130)
EOSINOPHIL # BLD AUTO: 0.2 THOU/UL (ref 0–0.7)
EOSINOPHIL NFR BLD AUTO: 3 % (ref 0–10)
GLUCOSE SERPL-MCNC: 73 MG/DL (ref 80–115)
HGB BLD-MCNC: 10.1 G/DL (ref 12–16)
HGB BLD-MCNC: 8.7 G/DL (ref 12–16)
LYMPHOCYTES # BLD: 2.3 THOU/UL (ref 1.2–3.4)
LYMPHOCYTES NFR BLD AUTO: 30.4 % (ref 21–51)
MAGNESIUM SERPL-MCNC: 1.8 MG/DL (ref 1.6–2.6)
MCH RBC QN AUTO: 34.2 PG (ref 27–31)
MCV RBC AUTO: 104 FL (ref 78–98)
MONOCYTES # BLD AUTO: 0.6 THOU/UL (ref 0.11–0.59)
MONOCYTES NFR BLD AUTO: 7.3 % (ref 0–10)
NEUTROPHILS # BLD AUTO: 4.4 THOU/UL (ref 1.4–6.5)
NEUTROPHILS NFR BLD AUTO: 58.9 % (ref 42–75)
PLATELET # BLD AUTO: 228 10X3/UL (ref 130–400)
POTASSIUM SERPL-SCNC: 3.5 MMOL/L (ref 3.5–5.1)
RBC # BLD AUTO: 2.96 MILL/UL (ref 4.2–5.4)
SODIUM SERPL-SCNC: 142 MMOL/L (ref 136–145)
WBC # BLD AUTO: 7.5 10X3/UL (ref 4.8–10.8)

## 2023-03-02 RX ADMIN — MULTIPLE VITAMINS W/ MINERALS TAB SCH TAB: TAB at 09:27

## 2023-03-02 RX ADMIN — MULTIPLE VITAMINS W/ MINERALS TAB SCH: TAB at 09:35

## 2023-03-02 RX ADMIN — Medication SCH: at 17:17

## 2023-03-02 RX ADMIN — HYDROCODONE BITARTRATE AND ACETAMINOPHEN PRN TAB: 10; 325 TABLET ORAL at 23:08

## 2023-03-02 RX ADMIN — ASPIRIN SCH MG: 81 TABLET ORAL at 09:27

## 2023-03-02 RX ADMIN — DOCUSATE SODIUM 50 MG AND SENNOSIDES 8.6 MG SCH TAB: 8.6; 5 TABLET, FILM COATED ORAL at 09:26

## 2023-03-02 RX ADMIN — Medication SCH: at 09:35

## 2023-03-02 RX ADMIN — DOCUSATE SODIUM 50 MG AND SENNOSIDES 8.6 MG SCH TAB: 8.6; 5 TABLET, FILM COATED ORAL at 20:14

## 2023-03-02 RX ADMIN — HYDROCODONE BITARTRATE AND ACETAMINOPHEN PRN TAB: 10; 325 TABLET ORAL at 19:02

## 2023-03-02 RX ADMIN — CYANOCOBALAMIN TAB 1000 MCG SCH MCG: 1000 TAB at 20:13

## 2023-03-02 RX ADMIN — ASPIRIN SCH MG: 81 TABLET ORAL at 20:13

## 2023-03-02 RX ADMIN — Medication SCH TAB: at 09:27

## 2023-03-03 VITALS — TEMPERATURE: 99 F

## 2023-03-03 VITALS — DIASTOLIC BLOOD PRESSURE: 49 MMHG | SYSTOLIC BLOOD PRESSURE: 92 MMHG

## 2023-03-03 LAB
ANION GAP SERPL CALC-SCNC: 13 MMOL/L (ref 10–20)
BASOPHILS # BLD AUTO: 0.1 THOU/UL (ref 0–0.2)
BASOPHILS NFR BLD AUTO: 0.9 % (ref 0–1)
BUN SERPL-MCNC: 8 MG/DL (ref 9.8–20.1)
CALCIUM SERPL-MCNC: 8.6 MG/DL (ref 7.8–10.44)
CHLORIDE SERPL-SCNC: 114 MMOL/L (ref 98–107)
CO2 SERPL-SCNC: 19 MMOL/L (ref 23–31)
CREAT CL PREDICTED SERPL C-G-VRATE: 71 ML/MIN (ref 70–130)
EOSINOPHIL # BLD AUTO: 0.2 THOU/UL (ref 0–0.7)
EOSINOPHIL NFR BLD AUTO: 2.4 % (ref 0–10)
GLUCOSE SERPL-MCNC: 96 MG/DL (ref 80–115)
HGB BLD-MCNC: 9.9 G/DL (ref 12–16)
LYMPHOCYTES # BLD: 1.4 THOU/UL (ref 1.2–3.4)
LYMPHOCYTES NFR BLD AUTO: 22.4 % (ref 21–51)
MCH RBC QN AUTO: 34.1 PG (ref 27–31)
MCV RBC AUTO: 103 FL (ref 78–98)
MONOCYTES # BLD AUTO: 0.4 THOU/UL (ref 0.11–0.59)
MONOCYTES NFR BLD AUTO: 5.8 % (ref 0–10)
NEUTROPHILS # BLD AUTO: 4.4 THOU/UL (ref 1.4–6.5)
NEUTROPHILS NFR BLD AUTO: 68.5 % (ref 42–75)
PLATELET # BLD AUTO: 231 10X3/UL (ref 130–400)
POTASSIUM SERPL-SCNC: 3.8 MMOL/L (ref 3.5–5.1)
RBC # BLD AUTO: 2.9 MILL/UL (ref 4.2–5.4)
SODIUM SERPL-SCNC: 142 MMOL/L (ref 136–145)
WBC # BLD AUTO: 6.4 10X3/UL (ref 4.8–10.8)

## 2023-03-03 RX ADMIN — HYDROCODONE BITARTRATE AND ACETAMINOPHEN PRN TAB: 10; 325 TABLET ORAL at 03:19

## 2023-03-03 RX ADMIN — HYDROCODONE BITARTRATE AND ACETAMINOPHEN PRN TAB: 10; 325 TABLET ORAL at 12:27

## 2023-03-03 RX ADMIN — Medication SCH: at 08:12

## 2023-03-03 RX ADMIN — MULTIPLE VITAMINS W/ MINERALS TAB SCH: TAB at 08:13

## 2023-03-03 RX ADMIN — DOCUSATE SODIUM 50 MG AND SENNOSIDES 8.6 MG SCH TAB: 8.6; 5 TABLET, FILM COATED ORAL at 08:25

## 2023-03-03 RX ADMIN — HYDROCODONE BITARTRATE AND ACETAMINOPHEN PRN TAB: 10; 325 TABLET ORAL at 08:23

## 2023-03-03 RX ADMIN — ASPIRIN SCH MG: 81 TABLET ORAL at 08:20

## 2023-09-06 ENCOUNTER — HOSPITAL ENCOUNTER (EMERGENCY)
Dept: HOSPITAL 9 - MADERS | Age: 68
Discharge: HOME | End: 2023-09-06
Payer: COMMERCIAL

## 2023-09-06 DIAGNOSIS — F17.210: ICD-10-CM

## 2023-09-06 DIAGNOSIS — G45.4: Primary | ICD-10-CM

## 2023-09-06 DIAGNOSIS — Z79.899: ICD-10-CM

## 2023-09-06 DIAGNOSIS — I10: ICD-10-CM

## 2023-09-06 LAB
ALBUMIN SERPL BCG-MCNC: 4.2 G/DL (ref 3.4–4.8)
ALP SERPL-CCNC: 101 U/L (ref 40–110)
ALT SERPL W P-5'-P-CCNC: 14 U/L (ref 8–55)
ANION GAP SERPL CALC-SCNC: 17 MMOL/L (ref 10–20)
APAP SERPL-MCNC: (no result) MCG/ML (ref 10–30)
AST SERPL-CCNC: 21 U/L (ref 5–34)
BASOPHILS # BLD AUTO: 0.1 THOU/UL (ref 0–0.2)
BASOPHILS NFR BLD AUTO: 1.2 % (ref 0–1)
BILIRUB SERPL-MCNC: (no result) MG/DL (ref 0.2–1.2)
BUN SERPL-MCNC: 36 MG/DL (ref 9.8–20.1)
CALCIUM SERPL-MCNC: 9.9 MG/DL (ref 7.8–10.44)
CAUTI INDICATIONS FOR CULTURE: (no result)
CHLORIDE SERPL-SCNC: 106 MMOL/L (ref 98–107)
CK SERPL-CCNC: 241 U/L (ref 29–168)
CO2 SERPL-SCNC: 24 MMOL/L (ref 23–31)
CREAT CL PREDICTED SERPL C-G-VRATE: 0 ML/MIN (ref 70–130)
DRUG SCREEN CUTOFF: (no result)
EOSINOPHIL # BLD AUTO: 0 THOU/UL (ref 0–0.7)
EOSINOPHIL NFR BLD AUTO: 0.5 % (ref 0–10)
GLOBULIN SER CALC-MCNC: 3.3 G/DL (ref 2.4–3.5)
GLUCOSE SERPL-MCNC: 90 MG/DL (ref 80–115)
HCT VFR BLD CALC: 32.8 % (ref 36–47)
HGB BLD-MCNC: 11 G/DL (ref 12–16)
LYMPHOCYTES # BLD AUTO: 0.9 THOU/UL (ref 1.2–3.4)
LYMPHOCYTES NFR BLD AUTO: 11 % (ref 21–51)
MAGNESIUM SERPL-MCNC: 1.8 MG/DL (ref 1.6–2.6)
MCH RBC QN AUTO: 33.5 PG (ref 27–31)
MCV RBC AUTO: 100.2 FL (ref 78–98)
MONOCYTES # BLD AUTO: 0.5 THOU/UL (ref 0.11–0.59)
MONOCYTES NFR BLD AUTO: 6.5 % (ref 0–10)
NEUTROPHILS # BLD AUTO: 6.5 THOU/UL (ref 1.4–6.5)
NEUTROPHILS NFR BLD AUTO: 80.9 % (ref 42–75)
PLATELET # BLD AUTO: 178 10X3/UL (ref 130–400)
POTASSIUM SERPL-SCNC: 4.1 MMOL/L (ref 3.5–5.1)
RBC # BLD AUTO: 3.27 MILL/UL (ref 4.2–5.4)
SALICYLATES SERPL-MCNC: (no result) MG/DL (ref 15–30)
SODIUM SERPL-SCNC: 143 MMOL/L (ref 136–145)
SP GR UR STRIP: 1.01 (ref 1–1.03)
WBC # BLD AUTO: 8 10X3/UL (ref 4.8–10.8)

## 2023-09-06 PROCEDURE — 84443 ASSAY THYROID STIM HORMONE: CPT

## 2023-09-06 PROCEDURE — 93005 ELECTROCARDIOGRAM TRACING: CPT

## 2023-09-06 PROCEDURE — 80307 DRUG TEST PRSMV CHEM ANLYZR: CPT

## 2023-09-06 PROCEDURE — 96360 HYDRATION IV INFUSION INIT: CPT

## 2023-09-06 PROCEDURE — 81001 URINALYSIS AUTO W/SCOPE: CPT

## 2023-09-06 PROCEDURE — 83735 ASSAY OF MAGNESIUM: CPT

## 2023-09-06 PROCEDURE — 85025 COMPLETE CBC W/AUTO DIFF WBC: CPT

## 2023-09-06 PROCEDURE — 80306 DRUG TEST PRSMV INSTRMNT: CPT

## 2023-09-06 PROCEDURE — 94760 N-INVAS EAR/PLS OXIMETRY 1: CPT

## 2023-09-06 PROCEDURE — 96361 HYDRATE IV INFUSION ADD-ON: CPT

## 2023-09-06 PROCEDURE — 82550 ASSAY OF CK (CPK): CPT

## 2023-09-06 PROCEDURE — 70450 CT HEAD/BRAIN W/O DYE: CPT

## 2023-09-06 PROCEDURE — 36415 COLL VENOUS BLD VENIPUNCTURE: CPT

## 2023-09-06 PROCEDURE — 80053 COMPREHEN METABOLIC PANEL: CPT
